# Patient Record
Sex: FEMALE | Race: WHITE | NOT HISPANIC OR LATINO | Employment: FULL TIME | URBAN - METROPOLITAN AREA
[De-identification: names, ages, dates, MRNs, and addresses within clinical notes are randomized per-mention and may not be internally consistent; named-entity substitution may affect disease eponyms.]

---

## 2020-08-11 LAB
HPV ABSTRACT: NORMAL
PAP SMEAR - HIM PATIENT REPORTED: NEGATIVE

## 2022-06-16 ENCOUNTER — HOSPITAL ENCOUNTER (OUTPATIENT)
Facility: CLINIC | Age: 32
End: 2022-06-16
Admitting: OBSTETRICS & GYNECOLOGY
Payer: COMMERCIAL

## 2022-06-16 ENCOUNTER — HOSPITAL ENCOUNTER (OUTPATIENT)
Facility: CLINIC | Age: 32
Discharge: HOME | End: 2022-06-16
Attending: OBSTETRICS & GYNECOLOGY | Admitting: OBSTETRICS & GYNECOLOGY
Payer: COMMERCIAL

## 2022-06-16 VITALS
HEIGHT: 65 IN | BODY MASS INDEX: 37.65 KG/M2 | DIASTOLIC BLOOD PRESSURE: 76 MMHG | SYSTOLIC BLOOD PRESSURE: 118 MMHG | WEIGHT: 226 LBS

## 2022-06-16 LAB
ALT SERPL W P-5'-P-CCNC: 17 U/L (ref 0–50)
AST SERPL W P-5'-P-CCNC: 15 U/L (ref 0–45)
CREAT SERPL-MCNC: 0.61 MG/DL (ref 0.52–1.04)
CREAT UR-MCNC: 151 MG/DL
ERYTHROCYTE [DISTWIDTH] IN BLOOD BY AUTOMATED COUNT: 13.2 % (ref 10–15)
GFR SERPL CREATININE-BSD FRML MDRD: >90 ML/MIN/1.73M2
HCT VFR BLD AUTO: 38.6 % (ref 35–47)
HGB BLD-MCNC: 13.1 G/DL (ref 11.7–15.7)
MCH RBC QN AUTO: 31.3 PG (ref 26.5–33)
MCHC RBC AUTO-ENTMCNC: 33.9 G/DL (ref 31.5–36.5)
MCV RBC AUTO: 92 FL (ref 78–100)
PLATELET # BLD AUTO: 317 10E3/UL (ref 150–450)
PROT UR-MCNC: 0.24 G/L
PROT/CREAT 24H UR: 0.16 G/G CR (ref 0–0.2)
RBC # BLD AUTO: 4.18 10E6/UL (ref 3.8–5.2)
WBC # BLD AUTO: 16.1 10E3/UL (ref 4–11)

## 2022-06-16 PROCEDURE — 36415 COLL VENOUS BLD VENIPUNCTURE: CPT | Performed by: OBSTETRICS & GYNECOLOGY

## 2022-06-16 PROCEDURE — 59025 FETAL NON-STRESS TEST: CPT

## 2022-06-16 PROCEDURE — 82565 ASSAY OF CREATININE: CPT | Performed by: OBSTETRICS & GYNECOLOGY

## 2022-06-16 PROCEDURE — 84460 ALANINE AMINO (ALT) (SGPT): CPT | Performed by: OBSTETRICS & GYNECOLOGY

## 2022-06-16 PROCEDURE — G0463 HOSPITAL OUTPT CLINIC VISIT: HCPCS | Mod: 25

## 2022-06-16 PROCEDURE — 84450 TRANSFERASE (AST) (SGOT): CPT | Performed by: OBSTETRICS & GYNECOLOGY

## 2022-06-16 PROCEDURE — 84156 ASSAY OF PROTEIN URINE: CPT | Performed by: OBSTETRICS & GYNECOLOGY

## 2022-06-16 PROCEDURE — 85027 COMPLETE CBC AUTOMATED: CPT | Performed by: OBSTETRICS & GYNECOLOGY

## 2022-06-16 RX ORDER — METOCLOPRAMIDE HYDROCHLORIDE 5 MG/ML
10 INJECTION INTRAMUSCULAR; INTRAVENOUS EVERY 6 HOURS PRN
Status: DISCONTINUED | OUTPATIENT
Start: 2022-06-16 | End: 2022-06-16 | Stop reason: HOSPADM

## 2022-06-16 RX ORDER — ONDANSETRON 2 MG/ML
4 INJECTION INTRAMUSCULAR; INTRAVENOUS EVERY 6 HOURS PRN
Status: DISCONTINUED | OUTPATIENT
Start: 2022-06-16 | End: 2022-06-16 | Stop reason: HOSPADM

## 2022-06-16 RX ORDER — PROCHLORPERAZINE MALEATE 5 MG
10 TABLET ORAL EVERY 6 HOURS PRN
Status: DISCONTINUED | OUTPATIENT
Start: 2022-06-16 | End: 2022-06-16 | Stop reason: HOSPADM

## 2022-06-16 RX ORDER — ONDANSETRON 4 MG/1
4 TABLET, ORALLY DISINTEGRATING ORAL EVERY 6 HOURS PRN
Status: DISCONTINUED | OUTPATIENT
Start: 2022-06-16 | End: 2022-06-16 | Stop reason: HOSPADM

## 2022-06-16 RX ORDER — PROCHLORPERAZINE 25 MG
25 SUPPOSITORY, RECTAL RECTAL EVERY 12 HOURS PRN
Status: DISCONTINUED | OUTPATIENT
Start: 2022-06-16 | End: 2022-06-16 | Stop reason: HOSPADM

## 2022-06-16 RX ORDER — METOCLOPRAMIDE 10 MG/1
10 TABLET ORAL EVERY 6 HOURS PRN
Status: DISCONTINUED | OUTPATIENT
Start: 2022-06-16 | End: 2022-06-16 | Stop reason: HOSPADM

## 2022-06-16 NOTE — PLAN OF CARE
Dr Meaghan Stallworth called regarding BP readings 120/70-80s and labs.  Patient still states her headache is gone now and no more symptoms.  Feeling baby move well, reviewed fetal kick counts.  Reactive NST, slight baseline change from 150s to 130s, MD aware.  Orders received to discharge home and make sure patient has appointment for later next week.  Reviewed signs and symptoms to report.  AVS signed with patient and all questions answered.  Patient discharged home, ambulatory upon discharge.

## 2022-06-16 NOTE — PLAN OF CARE
at 35w5d arrived to [facility] for preeclamptic evaluation.  She states that she tested positive for covid on 22 after her son tested positive earlier in the week.  She had mild symptoms of cough and stuffy nose, then last night she had a bad headache and nausea and vomiting.  She checked her BP at home and states it was high so she called the on call MD.  She was able to take tylenol and the headache went away, denies any symptoms of visual changes or epigastric pain.  Normal reflexes, no clonus.  Dr Meaghan Stallworth called and orders received for labs and monitoring.

## 2022-06-16 NOTE — DISCHARGE INSTRUCTIONS
Discharge Instruction for Undelivered Patients      You were seen for:  Pre-eclamptic evalutation  We Consulted: Dr Meaghan Stallworth  You had (Test or Medicine): Labs and fetal monitoring, BP checks     Diet:   Drink 8 to 12 glasses of liquids (milk, juice, water) every day.  You may eat meals and snacks.     Activity:  Count fetal kicks everyday (see handout)  Call your doctor or nurse midwife if your baby is moving less than usual.     Call your provider if you notice:  Swelling in your face or increased swelling in your hands or legs.  Headaches that are not relieved by Tylenol (acetaminophen).  Changes in your vision (blurring: seeing spots or stars.)  Nausea (sick to your stomach) and vomiting (throwing up).   Weight gain of 5 pounds or more per week.  Heartburn that doesn't go away.  Signs of bladder infection: pain when you urinate (use the toilet), need to go more often and more urgently.  The bag of walters (rupture of membranes) breaks, or you notice leaking in your underwear.  Bright red blood in your underwear.  Abdominal (lower belly) or stomach pain.  For first baby: Contractions (tightening) less than 5 minutes apart for one hour or more.  Second (plus) baby: Contractions (tightening) less than 10 minutes apart and getting stronger.    Follow-up:  As scheduled in the clinic

## 2023-10-24 ENCOUNTER — MEDICAL CORRESPONDENCE (OUTPATIENT)
Dept: HEALTH INFORMATION MANAGEMENT | Facility: CLINIC | Age: 33
End: 2023-10-24
Payer: COMMERCIAL

## 2023-10-24 ENCOUNTER — TRANSFERRED RECORDS (OUTPATIENT)
Dept: HEALTH INFORMATION MANAGEMENT | Facility: CLINIC | Age: 33
End: 2023-10-24
Payer: COMMERCIAL

## 2023-10-24 ENCOUNTER — TRANSCRIBE ORDERS (OUTPATIENT)
Dept: OTHER | Age: 33
End: 2023-10-24

## 2023-10-24 DIAGNOSIS — Z87.09 HISTORY OF ASTHMA: Primary | ICD-10-CM

## 2023-10-25 DIAGNOSIS — J45.909 ASTHMA: Primary | ICD-10-CM

## 2023-12-11 ENCOUNTER — TELEPHONE (OUTPATIENT)
Dept: PULMONOLOGY | Facility: CLINIC | Age: 33
End: 2023-12-11
Payer: COMMERCIAL

## 2023-12-11 NOTE — TELEPHONE ENCOUNTER
[facility] Call Center    Phone Message    May a detailed message be left on voicemail: yes     Reason for Call: Other: Pt is calling to see if she can still have her appointment on 12/18. Pt and daughter tested positive with COVID with on-set of symptoms yesterday 12/10. Please call pt back at [phone].      Action Taken: Message routed to:  Adult Clinics: Pulmonology p [id number]    Travel Screening: Not Applicable

## 2023-12-11 NOTE — TELEPHONE ENCOUNTER
Pt tested positive for COVID, has mild cold like symptoms. No fever. Informed pt that if she is afebrile and has improved symptoms, she will be able to keep her appointments on 12/18/23.  Pt will call clinic if she needs to cancel.     Kandis Avila RN on 12/11/2023 at 12:10 PM

## 2023-12-18 ENCOUNTER — OFFICE VISIT (OUTPATIENT)
Dept: NURSING | Facility: CLINIC | Age: 33
End: 2023-12-18
Payer: COMMERCIAL

## 2023-12-18 ENCOUNTER — ANCILLARY PROCEDURE (OUTPATIENT)
Dept: GENERAL RADIOLOGY | Facility: CLINIC | Age: 33
End: 2023-12-18
Payer: COMMERCIAL

## 2023-12-18 ENCOUNTER — OFFICE VISIT (OUTPATIENT)
Dept: PULMONOLOGY | Facility: CLINIC | Age: 33
End: 2023-12-18
Payer: COMMERCIAL

## 2023-12-18 VITALS — WEIGHT: 181.9 LBS | HEART RATE: 91 BPM | BODY MASS INDEX: 30.27 KG/M2 | OXYGEN SATURATION: 99 %

## 2023-12-18 VITALS — HEART RATE: 97 BPM | OXYGEN SATURATION: 98 % | DIASTOLIC BLOOD PRESSURE: 81 MMHG | SYSTOLIC BLOOD PRESSURE: 122 MMHG

## 2023-12-18 DIAGNOSIS — J45.909 ASTHMA: ICD-10-CM

## 2023-12-18 DIAGNOSIS — Z87.09 HISTORY OF ASTHMA: ICD-10-CM

## 2023-12-18 DIAGNOSIS — J45.40 MODERATE PERSISTENT ASTHMA, UNSPECIFIED WHETHER COMPLICATED: Primary | ICD-10-CM

## 2023-12-18 PROCEDURE — 94060 EVALUATION OF WHEEZING: CPT | Performed by: INTERNAL MEDICINE

## 2023-12-18 PROCEDURE — 94729 DIFFUSING CAPACITY: CPT | Performed by: INTERNAL MEDICINE

## 2023-12-18 PROCEDURE — 99204 OFFICE O/P NEW MOD 45 MIN: CPT | Performed by: PHYSICIAN ASSISTANT

## 2023-12-18 PROCEDURE — 71046 X-RAY EXAM CHEST 2 VIEWS: CPT | Mod: GC | Performed by: STUDENT IN AN ORGANIZED HEALTH CARE EDUCATION/TRAINING PROGRAM

## 2023-12-18 PROCEDURE — 94726 PLETHYSMOGRAPHY LUNG VOLUMES: CPT | Performed by: INTERNAL MEDICINE

## 2023-12-18 RX ORDER — DESOGESTREL AND ETHINYL ESTRADIOL 0.15-0.03
KIT ORAL
COMMUNITY
Start: 2023-10-24

## 2023-12-18 RX ORDER — ALBUTEROL SULFATE 90 UG/1
1-2 AEROSOL, METERED RESPIRATORY (INHALATION)
COMMUNITY
Start: 2022-02-07 | End: 2023-12-18

## 2023-12-18 RX ORDER — FLUTICASONE PROPIONATE AND SALMETEROL 250; 50 UG/1; UG/1
1 POWDER RESPIRATORY (INHALATION) EVERY 12 HOURS
Qty: 60 EACH | Refills: 3 | Status: SHIPPED | OUTPATIENT
Start: 2023-12-18 | End: 2023-12-18

## 2023-12-18 RX ORDER — ALBUTEROL SULFATE 90 UG/1
2 AEROSOL, METERED RESPIRATORY (INHALATION) EVERY 6 HOURS PRN
Qty: 8 G | Refills: 11 | Status: SHIPPED | OUTPATIENT
Start: 2023-12-18

## 2023-12-18 RX ORDER — FLUTICASONE PROPIONATE AND SALMETEROL 250; 50 UG/1; UG/1
1 POWDER RESPIRATORY (INHALATION) EVERY 12 HOURS
Qty: 60 EACH | Refills: 5 | Status: SHIPPED | OUTPATIENT
Start: 2023-12-18 | End: 2023-12-20

## 2023-12-18 ASSESSMENT — ASTHMA QUESTIONNAIRES
QUESTION_4 LAST FOUR WEEKS HOW OFTEN HAVE YOU USED YOUR RESCUE INHALER OR NEBULIZER MEDICATION (SUCH AS ALBUTEROL): TWO OR THREE TIMES PER WEEK
ACT_TOTALSCORE: 16
QUESTION_3 LAST FOUR WEEKS HOW OFTEN DID YOUR ASTHMA SYMPTOMS (WHEEZING, COUGHING, SHORTNESS OF BREATH, CHEST TIGHTNESS OR PAIN) WAKE YOU UP AT NIGHT OR EARLIER THAN USUAL IN THE MORNING: ONCE A WEEK
ACT_TOTALSCORE: 16
QUESTION_5 LAST FOUR WEEKS HOW WOULD YOU RATE YOUR ASTHMA CONTROL: SOMEWHAT CONTROLLED
QUESTION_2 LAST FOUR WEEKS HOW OFTEN HAVE YOU HAD SHORTNESS OF BREATH: THREE TO SIX TIMES A WEEK
ACUTE_EXACERBATION_TODAY: NO
QUESTION_1 LAST FOUR WEEKS HOW MUCH OF THE TIME DID YOUR ASTHMA KEEP YOU FROM GETTING AS MUCH DONE AT WORK, SCHOOL OR AT HOME: A LITTLE OF THE TIME

## 2023-12-18 NOTE — NURSING NOTE
Chief Complaint   Patient presents with    New Patient     She requests these members of her care team be copied on today's visit information: pcp    PCP: No Ref-Primary, Physician    Referring Provider:  Nia Cummings DO  [address]  [place] [id number]    Vitals:    12/18/23 1345   BP: 122/81   BP Location: Left arm   Patient Position: Sitting   Cuff Size: Adult Large   Pulse: 97   SpO2: 98%       There is no height or weight on file to calculate BMI.    Medications were reconciled.    Does patient need any medication refills at today's visit? none      Joan Medina CMA

## 2023-12-18 NOTE — PROGRESS NOTES
[facility]- Pulmonary Clinic  New Pulmonary Consult    Name: Emily Hair MRN: [id number]     Age: 33 year old   YOB: 1990       Reason for Visit:   Chief Complaint   Patient presents with    New Patient             Assessment and Plan:     Emily Hair is a 33 year old female who presents for evaluation of chronic cough.     # Chronic cough  - seasonal allergies  - Likely mild to moderate asthma  2 year history of intermittent productive cough triggered by URIs and seasonal allergies. Benefited from short trial of ICS (Flovent) as prescribed by her OB while pregnant 2 years ago, ran out of rx years ago. Uses albuterol PRN when she wakes up with dyspnea or wheeze, which does help. She lives a very active lifestyle and denies respiratory symptoms with exertion. Endorses some nasal congestion, postnasal drip, and has early spring/late summer allergies. Well controlled with OTC antihistamines. PFTs above normal, could be normal variant. CXR unremarkable. Given response to ICS I think it is likely that she has mild to moderate persistent asthma and would benefit from ICS-LABA. She can continue albuterol PRN, and OTC antihistamines. If symptoms persist she may benefit from the addition of Montelukast as she does seem to have an allergic component to her asthma.   - Consider singulair if symptoms not improved  - Start Advair 250 1 puff BID. OK to substitute with formulary equivalent per insurance.        Return to clinic in 6 months (this is when her allergies start to peak) or sooner if needed      35 minutes spent reviewing chart, reviewing test results, talking with and examining patient, formulating plan, and documentation on the day of the encounter.    Debbie Pitt PA-C  General Pulmonology  Pager #[phone]             HPI:   Emily Hair is a 33 year old female who presents for evaluation of chronic cough.     About 2 years ago while pregnant she had a viral illness and developed a  lingering cough. Now every time she has a URI she has an ongoing cough. Was prescribed flovent 1 puff 1-2x per day with significant benefit, not refilled after having her baby. Would use albuterol when she had a cough or cold.   No dyspnea with activity. Exercises 5x per week without issue. Sometimes takes albuterol when she wakes up for shortness of breath or wheeze which helps. Coughs up thick mucus in the morning, sometimes green-yellow. Taking a bath or drinking alcohol can also trigger shortness of breath/wheeze. Some nasal congestion, postnasal drip, and has early spring/late summer allergies. Well controlled with OTC antihistamines. Takes OTC antihistamines year round which helps. No GERD.     History of possible exercise-induced asthma as a kid, not formally tested and was untreated.     10 point ROS performed and negative except for as noted in HPI.    Tobacco or vaping: no, exposed to secondhand cigar smoke from her father  Occupation: OT  Pets: 1 dog  Family history: no family history of lung disease  Vaccines: UTD COVID, flu           Past Medical History:   No past medical history on file.          Past Surgical History:    No past surgical history on file.          Social History:     Social History     Socioeconomic History    Marital status:      Spouse name: Not on file    Number of children: Not on file    Years of education: Not on file    Highest education level: Not on file   Occupational History    Not on file   Tobacco Use    Smoking status: Never    Smokeless tobacco: Never   Substance and Sexual Activity    Alcohol use: Not Currently    Drug use: Never    Sexual activity: Not on file   Other Topics Concern    Not on file   Social History Narrative    Not on file     Social Determinants of Health     Financial Resource Strain: Not on file   Food Insecurity: Not on file   Transportation Needs: Not on file   Physical Activity: Not on file   Stress: Not on file   Social Connections: Not  on file   Interpersonal Safety: Not on file   Housing Stability: Not on file            Family History:   No family history on file.          Allergies:   No Known Allergies          Medications:   ENSKYCE 0.15-30 MG-MCG tablet,     No current facility-administered medications on file prior to visit.             Exam:   /81 (BP Location: Left arm, Patient Position: Sitting, Cuff Size: Adult Large)   Pulse 97   SpO2 98%     Gen: well-appearing, NAD  CV: RRR, no murmurs  Resp: Normal respiratory effort on room air. Clear to auscultation bilaterally without wheezes, rales or ronchi.   Skin: no obvious rashes on gross evaluation  Neuro: alert and appropriate, no focal abnormalities         Data:     I have personally reviewed all pertinent labs, imaging studies and PFT results unless otherwise noted.    Labs: N/A    Imaging:  CXR 12/18/2023- unremarkable    PFT:  12/18/2023- above normal PFTs, could be normal variant

## 2023-12-19 DIAGNOSIS — J45.40 MODERATE PERSISTENT ASTHMA, UNSPECIFIED WHETHER COMPLICATED: ICD-10-CM

## 2023-12-19 LAB
DLCOUNC-%PRED-PRE: 145 %
DLCOUNC-PRE: 32.67 ML/MIN/MMHG
DLCOUNC-PRED: 22.45 ML/MIN/MMHG
ERV-%PRED-PRE: 101 %
ERV-PRE: 1.3 L
ERV-PRED: 1.28 L
EXPTIME-PRE: 6.62 SEC
FEF2575-%PRED-POST: 110 %
FEF2575-%PRED-PRE: 97 %
FEF2575-POST: 3.68 L/SEC
FEF2575-PRE: 3.24 L/SEC
FEF2575-PRED: 3.32 L/SEC
FEFMAX-%PRED-PRE: 105 %
FEFMAX-PRE: 7.57 L/SEC
FEFMAX-PRED: 7.17 L/SEC
FEV1-%PRED-PRE: 124 %
FEV1-PRE: 3.76 L
FEV1FEV6-PRE: 77 %
FEV1FEV6-PRED: 85 %
FEV1FVC-PRE: 77 %
FEV1FVC-PRED: 85 %
FEV1SVC-PRE: 80 %
FEV1SVC-PRED: 82 %
FIFMAX-PRE: 3.66 L/SEC
FRCPLETH-%PRED-PRE: 101 %
FRCPLETH-PRE: 2.78 L
FRCPLETH-PRED: 2.73 L
FVC-%PRED-PRE: 135 %
FVC-PRE: 4.85 L
FVC-PRED: 3.59 L
IC-%PRED-PRE: 132 %
IC-PRE: 3.39 L
IC-PRED: 2.56 L
RVPLETH-%PRED-PRE: 96 %
RVPLETH-PRE: 1.48 L
RVPLETH-PRED: 1.53 L
TLCPLETH-%PRED-PRE: 120 %
TLCPLETH-PRE: 6.17 L
TLCPLETH-PRED: 5.11 L
VA-%PRED-PRE: 115 %
VA-PRE: 5.8 L
VC-%PRED-PRE: 126 %
VC-PRE: 4.7 L
VC-PRED: 3.71 L

## 2023-12-19 NOTE — TELEPHONE ENCOUNTER
[facility] Call Center    Phone Message    May a detailed message be left on voicemail: yes     Reason for Call: Medication Question or concern regarding medication   Prescription Clarification  Name of Medication: fluticasone-salmeterol (ADVAIR) 250-50 MCG/ACT inhaler   Prescribing Provider: Debbie Pitt PA-C    Pharmacy: [id number] - [place], [place] - [address]   What on the order needs clarification? Patient states the pharamcy informed her this medication is on back order for all [facility] pharmacies, she would like to know if the rx can [facility], or if there are any possible alternatives for medication? Please call back to discuss. Thank you.     Action Taken: Other: Pulm    Travel Screening: Not Applicable

## 2023-12-20 RX ORDER — FLUTICASONE PROPIONATE AND SALMETEROL 250; 50 UG/1; UG/1
1 POWDER RESPIRATORY (INHALATION) EVERY 12 HOURS
Qty: 3 EACH | Refills: 3 | Status: SHIPPED | OUTPATIENT
Start: 2023-12-20

## 2023-12-20 NOTE — TELEPHONE ENCOUNTER
Message sent to provider requesting that Rx be sent to [facility] per pt request.    Nadia Larsen LPN  Pulmonary Medicine:  [facility]  Phone: [phone] Fax: [phone]

## 2023-12-21 ENCOUNTER — TELEPHONE (OUTPATIENT)
Dept: PULMONOLOGY | Facility: CLINIC | Age: 33
End: 2023-12-21
Payer: COMMERCIAL

## 2023-12-21 ENCOUNTER — MYC MEDICAL ADVICE (OUTPATIENT)
Dept: PULMONOLOGY | Facility: CLINIC | Age: 33
End: 2023-12-21
Payer: COMMERCIAL

## 2023-12-21 DIAGNOSIS — J45.40 MODERATE PERSISTENT ASTHMA, UNSPECIFIED WHETHER COMPLICATED: Primary | ICD-10-CM

## 2023-12-21 NOTE — TELEPHONE ENCOUNTER
Spoke with patient regarding Advair alternatives. Informed patient that a list of alternative options for Advair were sent to her via [employer]. Patient states that she will contact her insurance company and get back to [facility] with the most affordable option.    Nadia Larsen LPN  Pulmonary Medicine:  [facility]  Phone: [phone] Fax: [phone]

## 2023-12-21 NOTE — TELEPHONE ENCOUNTER
[facility] Call Center    Phone Message    May a detailed message be left on voicemail: yes     Reason for Call: Medication Question or concern regarding medication   Prescription Clarification    Name of Medication:   fluticasone-salmeterol (ADVAIR) 250-50 MCG/ACT inhaler     Prescribing Provider: Yoandy     Pharmacy:   [id number] [place] [place] [facility] [address] [facility]        What on the order needs clarification? Patient states the pharmacy has the medication on backorder. Patient states she then had the medication sent to [facility] and the cost was expensive. Patient states she is wanting to know if there is another alternative for the medication that would be cheaper. Patient states if Yoandy Lewis has a cheaper alterative medication she would like the prescription to be sent to the [facility]. Please advise.       Action Taken: Message routed to:  [facility] Lung    Travel Screening: Not Applicable

## 2023-12-27 ENCOUNTER — TELEPHONE (OUTPATIENT)
Dept: PULMONOLOGY | Facility: CLINIC | Age: 33
End: 2023-12-27
Payer: COMMERCIAL

## 2023-12-27 RX ORDER — FLUTICASONE PROPIONATE AND SALMETEROL 250; 50 UG/1; UG/1
1 POWDER RESPIRATORY (INHALATION) EVERY 12 HOURS
Qty: 14 EACH | Refills: 3 | Status: SHIPPED | OUTPATIENT
Start: 2023-12-27

## 2023-12-27 NOTE — TELEPHONE ENCOUNTER
Prior Authorization Retail Medication Request    Medication/Dose:   Diagnosis and ICD code (if different than what is on RX):    New/renewal/insurance change PA/secondary ins. PA:  Previously Tried and Failed:    Rationale:      Insurance   Primary:   Insurance ID:      Secondary (if applicable):  Insurance ID:      Pharmacy Information (if different than what is on RX)  Name:    Phone:    Fax:

## 2023-12-27 NOTE — TELEPHONE ENCOUNTER
PA requested for Wixela. Telephone encounter started.    Kandis Avila RN on 12/27/2023 at 12:45 PM

## 2023-12-30 NOTE — TELEPHONE ENCOUNTER
Central Prior Authorization Team   Phone: [phone]    PA Initiation-initiated with Brittany [facility] via phone    Medication: WIXELA INHUB 250-50 MCG/ACT IN AEPB  Insurance Company: [facility] Non-Specialty PA's - Phone [phone] Fax [phone]  Pharmacy Filling the Rx: [id number] - [place], [place] - [address]  Filling Pharmacy Phone: [phone]  Filling Pharmacy Fax:    Start Date: 12/30/2023

## 2023-12-30 NOTE — TELEPHONE ENCOUNTER
Prior Authorization Not Needed per Insurance-Per Brittany at [employer], this does not need a PA. It is covered by the plan.    Medication: WIXELA INHUB 250-50 MCG/ACT IN AEPB  Insurance Company: [facility] Non-Specialty PA's - Phone [phone] Fax [phone]  Expected CoPay: $    Pharmacy Filling the Rx: [id number] - [place], [place] - [address] [place] [address]  Pharmacy Notified:Yes-[facility] indicates they have a paid claim and it is ready for pt to . Copay is <$50  Patient Notified:

## 2023-12-30 NOTE — TELEPHONE ENCOUNTER
Rx shows Advair Diskus, but requestor states Wixela. I'm not clear which this is supposed to be. I'll initiate for Wexela per note.

## 2023-12-31 ENCOUNTER — HEALTH MAINTENANCE LETTER (OUTPATIENT)
Age: 33
End: 2023-12-31

## 2024-06-19 ENCOUNTER — VIRTUAL VISIT (OUTPATIENT)
Dept: PULMONOLOGY | Facility: CLINIC | Age: 34
End: 2024-06-19
Attending: PHYSICIAN ASSISTANT
Payer: COMMERCIAL

## 2024-06-19 DIAGNOSIS — J45.40 MODERATE PERSISTENT ASTHMA, UNSPECIFIED WHETHER COMPLICATED: Primary | ICD-10-CM

## 2024-06-19 PROCEDURE — 99214 OFFICE O/P EST MOD 30 MIN: CPT | Mod: 95 | Performed by: PHYSICIAN ASSISTANT

## 2024-06-19 RX ORDER — FLUTICASONE PROPIONATE AND SALMETEROL 113; 14 UG/1; UG/1
1 POWDER, METERED RESPIRATORY (INHALATION) 2 TIMES DAILY
Qty: 1 EACH | Refills: 5 | Status: SHIPPED | OUTPATIENT
Start: 2024-06-19

## 2024-06-19 NOTE — PATIENT INSTRUCTIONS
Airduo 1 puff 1-2x daily unitl you meet your deductible, then let us know and we can switch back to Wixela/Advair    Follow up 1 year or sooner if needed.

## 2024-06-19 NOTE — PROGRESS NOTES
Virtual Visit Details    Type of service:  Video Visit     Originating Location (pt. Location): Home    Distant Location (provider location):  On-site  Platform used for Video Visit: Johnny  2:34PM-2:43PM    [facility]- Pulmonary Clinic  Follow Up    Name: Emily Hair MRN: [id number]     Age: 33 year old   YOB: 1990       Reason for Visit:   Chief Complaint   Patient presents with    RECHECK             Assessment and Plan:         # Asthma  Initially presented 12/2023 with 2 year history of intermittent productive cough triggered by URIs and seasonal allergies. She lives a very active lifestyle and denies respiratory symptoms with exertion. Endorses some nasal congestion, postnasal drip, and has early spring/late summer allergies. Well controlled with OTC antihistamines. PFTs above normal, could be normal variant. CXR 12/2023 unremarkable. Benefiting from ICS-LABA (Advair 250 1 puff once daily) but inhalers are costly until she meets her deductible. Provided GoodRx coupon over email, out of pocket cost $43/month. Once she meets her deductible we can switch back to Advair/Wixela.She can continue albuterol PRN, and OTC antihistamines. If symptoms worsen she may benefit from the addition of Montelukast as she does seem to have an allergic component to her asthma.   - Consider singulair if symptoms worsen  - Continue Advair 250 or AirDuo 113 1 puff daily. OK to substitute with formulary equivalent per insurance.        Return to clinic in 12 months or with PCP      35 minutes spent reviewing chart, reviewing test results, talking with and examining patient, formulating plan, and documentation on the day of the encounter.    Debbie Pitt PA-C  General Pulmonology            HPI:   Emily Hair is a 34 year old female who presents for follow up of chronic cough. I last saw her in clinic 12/2023.     In summary,  about 2 years ago while pregnant she had a viral illness and developed a  lingering cough. Now every time she has a URI she has an ongoing cough. Was prescribed flovent 1 puff 1-2x per day with significant benefit, not refilled after having her baby. Would use albuterol when she had a cough or cold. Would sometimes use albuterol when she wakes up for shortness of breath or wheeze which helps. Was coughing up thick mucus in the morning, sometimes green-yellow. Taking a bath or drinking alcohol can also trigger shortness of breath/wheeze. History of possible exercise-induced asthma as a kid, not formally tested and was untreated. At our last clinic visit I prescribed moderate dose ICS-LABA (Advair 250) for suspected underlying asthma.     Since starting daily Advair she hasn't gotten sick as much and rarely using albuterol inhaler. The first month she used it twice daily but then the inhaler became $200 so she decreased to 1 puff daily which works well for her. Dyspnea, wheeze, cough resolved. Only coughs when she gets a cold. If she gets a cold the cough goes away quicker and no longer as thick phlegm. Some nasal congestion, postnasal drip, and has early spring/late summer allergies. This year allergies haven't been too bad, using OTC antihistamines year round which controls her symptoms well. No GERD. No dyspnea with activity. Exercises 5x per week without issue.     10 point ROS performed and negative except for as noted in HPI.    Tobacco or vaping: no, exposed to secondhand cigar smoke from her father  Occupation: OT  Pets: 1 dog  Family history: no family history of lung disease  Vaccines: UTD COVID, flu           Past Medical History:   No past medical history on file.          Past Surgical History:    No past surgical history on file.          Social History:     Social History     Socioeconomic History    Marital status:      Spouse name: Not on file    Number of children: Not on file    Years of education: Not on file    Highest education level: Not on file   Occupational  History    Not on file   Tobacco Use    Smoking status: Never    Smokeless tobacco: Never   Substance and Sexual Activity    Alcohol use: Not Currently    Drug use: Never    Sexual activity: Not on file   Other Topics Concern    Not on file   Social History Narrative    Not on file     Social Determinants of Health     Financial Resource Strain: High Risk (2/7/2022)    Received from [facility], [facility]    Financial Resource Strain     Difficulty of Paying Living Expenses: Not on file     Difficulty of Paying Living Expenses: Not on file   Food Insecurity: Not on file   Transportation Needs: Not on file   Physical Activity: Not on file   Stress: Not on file   Social Connections: Unknown (2/7/2022)    [facility], [facility]    Social Connections     Frequency of Communication with Friends and Family: Not on file   Interpersonal Safety: Not on file   Housing Stability: Not on file            Family History:   No family history on file.          Allergies:   No Known Allergies          Medications:     Current Outpatient Medications   Medication Sig Dispense Refill    albuterol (PROAIR HFA/PROVENTIL HFA/VENTOLIN HFA) 108 (90 Base) MCG/ACT inhaler Inhale 2 puffs into the lungs every 6 hours as needed for shortness of breath, wheezing or cough 8 g 11    ENSKYCE 0.15-30 MG-MCG tablet       fluticasone-salmeterol (ADVAIR) 250-50 MCG/ACT inhaler Inhale 1 puff into the lungs every 12 hours 14 each 3    fluticasone-salmeterol (ADVAIR) 250-50 MCG/ACT inhaler Inhale 1 puff into the lungs every 12 hours 3 each 3     No current facility-administered medications for this visit.              Exam:   There were no vitals taken for this visit.    Gen: well-appearing, NAD  Resp: Normal respiratory effort on room air.    Skin: no obvious rashes on gross evaluation  Neuro: alert and  appropriate, no focal abnormalities         Data:     I have personally reviewed all pertinent labs, imaging studies and PFT results unless otherwise noted.    Labs: N/A    Imaging:  CXR 12/18/2023- unremarkable    PFT:  12/18/2023- above normal PFTs, could be normal variant

## 2024-06-19 NOTE — LETTER
6/19/2024      Emily Hair  [address]  [facility] [address]      Dear Colleague,    Thank you for referring your patient, Emily Hair, to the [facility] [place]. Please see a copy of my visit note below.    Virtual Visit Details    Type of service:  Video Visit     Originating Location (pt. Location): Home    Distant Location (provider location):  On-site  Platform used for Video Visit: AmWell  2:34PM-2:43PM    [facility]- Pulmonary Clinic  Follow Up    Name: Emily Hair MRN: [id number]     Age: 33 year old   YOB: 1990       Reason for Visit:   Chief Complaint   Patient presents with    RECHECK             Assessment and Plan:         # Asthma  Initially presented 12/2023 with 2 year history of intermittent productive cough triggered by URIs and seasonal allergies. She lives a very active lifestyle and denies respiratory symptoms with exertion. Endorses some nasal congestion, postnasal drip, and has early spring/late summer allergies. Well controlled with OTC antihistamines. PFTs above normal, could be normal variant. CXR 12/2023 unremarkable. Benefiting from ICS-LABA (Advair 250 1 puff once daily) but inhalers are costly until she meets her deductible. Provided GoodRx coupon over email, out of pocket cost $43/month. Once she meets her deductible we can switch back to Advair/Wixela.She can continue albuterol PRN, and OTC antihistamines. If symptoms worsen she may benefit from the addition of Montelukast as she does seem to have an allergic component to her asthma.   - Consider singulair if symptoms worsen  - Continue Advair 250 or AirDuo 113 1 puff daily. OK to substitute with formulary equivalent per insurance.        Return to clinic in 12 months or with PCP      35 minutes spent reviewing chart, reviewing test results, talking with and examining patient, formulating plan, and documentation on the day of the encounter.    Debbie Pitt PA-C  General  Pulmonology            HPI:   Emily Hair is a 34 year old female who presents for follow up of chronic cough. I last saw her in clinic 12/2023.     In summary,  about 2 years ago while pregnant she had a viral illness and developed a lingering cough. Now every time she has a URI she has an ongoing cough. Was prescribed flovent 1 puff 1-2x per day with significant benefit, not refilled after having her baby. Would use albuterol when she had a cough or cold. Would sometimes use albuterol when she wakes up for shortness of breath or wheeze which helps. Was coughing up thick mucus in the morning, sometimes green-yellow. Taking a bath or drinking alcohol can also trigger shortness of breath/wheeze. History of possible exercise-induced asthma as a kid, not formally tested and was untreated. At our last clinic visit I prescribed moderate dose ICS-LABA (Advair 250) for suspected underlying asthma.     Since starting daily Advair she hasn't gotten sick as much and rarely using albuterol inhaler. The first month she used it twice daily but then the inhaler became $200 so she decreased to 1 puff daily which works well for her. Dyspnea, wheeze, cough resolved. Only coughs when she gets a cold. If she gets a cold the cough goes away quicker and no longer as thick phlegm. Some nasal congestion, postnasal drip, and has early spring/late summer allergies. This year allergies haven't been too bad, using OTC antihistamines year round which controls her symptoms well. No GERD. No dyspnea with activity. Exercises 5x per week without issue.     10 point ROS performed and negative except for as noted in HPI.    Tobacco or vaping: no, exposed to secondhand cigar smoke from her father  Occupation: OT  Pets: 1 dog  Family history: no family history of lung disease  Vaccines: UTD COVID, flu           Past Medical History:   No past medical history on file.          Past Surgical History:    No past surgical history on file.           Social History:     Social History     Socioeconomic History    Marital status:      Spouse name: Not on file    Number of children: Not on file    Years of education: Not on file    Highest education level: Not on file   Occupational History    Not on file   Tobacco Use    Smoking status: Never    Smokeless tobacco: Never   Substance and Sexual Activity    Alcohol use: Not Currently    Drug use: Never    Sexual activity: Not on file   Other Topics Concern    Not on file   Social History Narrative    Not on file     Social Determinants of Health     Financial Resource Strain: High Risk (2/7/2022)    [facility]    Financial Resource Strain     Difficulty of Paying Living Expenses: Not on file     Difficulty of Paying Living Expenses: Not on file   Food Insecurity: Not on file   Transportation Needs: Not on file   Physical Activity: Not on file   Stress: Not on file   Social Connections: Unknown (2/7/2022)    Received from [facility], [facility]    Social Connections     Frequency of Communication with Friends and Family: Not on file   Interpersonal Safety: Not on file   Housing Stability: Not on file            Family History:   No family history on file.          Allergies:   No Known Allergies          Medications:     Current Outpatient Medications   Medication Sig Dispense Refill    albuterol (PROAIR HFA/PROVENTIL HFA/VENTOLIN HFA) 108 (90 Base) MCG/ACT inhaler Inhale 2 puffs into the lungs every 6 hours as needed for shortness of breath, wheezing or cough 8 g 11    ENSKYCE 0.15-30 MG-MCG tablet       fluticasone-salmeterol (ADVAIR) 250-50 MCG/ACT inhaler Inhale 1 puff into the lungs every 12 hours 14 each 3    fluticasone-salmeterol (ADVAIR) 250-50 MCG/ACT inhaler Inhale 1 puff into the lungs every 12 hours 3 each 3     No current  facility-administered medications for this visit.              Exam:   There were no vitals taken for this visit.    Gen: well-appearing, NAD  Resp: Normal respiratory effort on room air.    Skin: no obvious rashes on gross evaluation  Neuro: alert and appropriate, no focal abnormalities         Data:     I have personally reviewed all pertinent labs, imaging studies and PFT results unless otherwise noted.    Labs: N/A    Imaging:  CXR 12/18/2023- unremarkable    PFT:  12/18/2023- above normal PFTs, could be normal variant           Debbie Pitt PA-C

## 2024-06-19 NOTE — NURSING NOTE
Is the patient currently in the state of [place]? YES    Visit mode:VIDEO    If the visit is dropped, the patient can be reconnected by: VIDEO VISIT: Text to cell phone:   Telephone Information:   Mobile [phone]       Will anyone else be joining the visit? NO  (If patient encounters technical issues they should call [phone]    How would you like to obtain your AVS? MyChart    Are changes needed to the allergy or medication list? Pt stated no changes to allergies and Pt stated no med changes    Are refills needed on medications prescribed by this physician? NO    Reason for visit: LINDSEY GRANADOS

## 2024-12-15 ENCOUNTER — HEALTH MAINTENANCE LETTER (OUTPATIENT)
Age: 34
End: 2024-12-15

## 2025-01-23 ENCOUNTER — MYC MEDICAL ADVICE (OUTPATIENT)
Dept: PULMONOLOGY | Facility: CLINIC | Age: 35
End: 2025-01-23
Payer: COMMERCIAL

## 2025-01-23 DIAGNOSIS — J45.40 MODERATE PERSISTENT ASTHMA, UNSPECIFIED WHETHER COMPLICATED: ICD-10-CM

## 2025-01-23 RX ORDER — FLUTICASONE PROPIONATE AND SALMETEROL 250; 50 UG/1; UG/1
1 POWDER RESPIRATORY (INHALATION) EVERY 12 HOURS
Qty: 14 EACH | Refills: 4 | Status: SHIPPED | OUTPATIENT
Start: 2025-01-23

## 2025-01-23 NOTE — TELEPHONE ENCOUNTER
Patient previously on airduo respiclick but with insurance change she is requesting to go on wixela. Wixela order pended for provider to review and sign.   See [facility] for further details.  Lauri Blake RN on 1/23/2025 at 1:34 PM

## 2025-04-03 NOTE — TELEPHONE ENCOUNTER
AirPR message has been sent to the patient with Advair alternatives so that she may inquire on pricing with insurance.    Nadia Larsen LPN  Pulmonary Medicine:  [facility]  Phone: [phone] Fax: [phone]      PATIENT IS CALLING IN REQUESTING A CORRECTION IN HER PAIN MEDICATION. SHE HAD A LEFT KNEE REPLACEMENT ON 04/01/25. SHE SAID SHE ONLY RECEIVED OXYCODONE 5 MG - 35 TABLETS AFTER HER SURGERY. SHE STATES THAT HER AND DR. REYNA DISCUSSED HER GETTING OXYCODONE 10 MG. SHE SAYS WITH THE 5 MG THE DAY SUPPLY IS ONLY 4 AND HALF DAYS AND THAT'S NOT RIGHT OR WHAT WAS DISCUSSED. SHE IS WANTING THE OXYCODONE 10 MG CALLED IN.    IF DR. REYNA OR CLINICAL STAFF COULD PLEASE ADVISE.     CALL BACK # [phone]    PHARMACY: [facility] [place]

## 2025-08-11 DIAGNOSIS — J45.20 MILD INTERMITTENT ASTHMA WITHOUT COMPLICATION: ICD-10-CM

## 2025-08-11 RX ORDER — BUDESONIDE AND FORMOTEROL FUMARATE DIHYDRATE 80; 4.5 UG/1; UG/1
2 AEROSOL RESPIRATORY (INHALATION) 2 TIMES DAILY
Qty: 30.6 G | Refills: 3 | Status: SHIPPED | OUTPATIENT
Start: 2025-08-11